# Patient Record
Sex: MALE | Race: ASIAN | Employment: STUDENT | URBAN - NONMETROPOLITAN AREA
[De-identification: names, ages, dates, MRNs, and addresses within clinical notes are randomized per-mention and may not be internally consistent; named-entity substitution may affect disease eponyms.]

---

## 2024-04-26 ENCOUNTER — OFFICE VISIT (OUTPATIENT)
Age: 24
End: 2024-04-26

## 2024-04-26 VITALS
SYSTOLIC BLOOD PRESSURE: 112 MMHG | HEART RATE: 64 BPM | DIASTOLIC BLOOD PRESSURE: 70 MMHG | HEIGHT: 68 IN | TEMPERATURE: 98.9 F | OXYGEN SATURATION: 99 % | RESPIRATION RATE: 16 BRPM | BODY MASS INDEX: 17.58 KG/M2 | WEIGHT: 116 LBS

## 2024-04-26 DIAGNOSIS — Z00.00 HEALTHY ADULT ON ROUTINE PHYSICAL EXAMINATION: ICD-10-CM

## 2024-04-26 DIAGNOSIS — Z11.1 SCREENING FOR TUBERCULOSIS: Primary | ICD-10-CM

## 2024-04-26 PROCEDURE — 99202 OFFICE O/P NEW SF 15 MIN: CPT | Performed by: NURSE PRACTITIONER

## 2024-04-26 PROCEDURE — 86580 TB INTRADERMAL TEST: CPT | Performed by: NURSE PRACTITIONER

## 2024-04-26 SDOH — ECONOMIC STABILITY: FOOD INSECURITY: WITHIN THE PAST 12 MONTHS, THE FOOD YOU BOUGHT JUST DIDN'T LAST AND YOU DIDN'T HAVE MONEY TO GET MORE.: NEVER TRUE

## 2024-04-26 SDOH — ECONOMIC STABILITY: INCOME INSECURITY: HOW HARD IS IT FOR YOU TO PAY FOR THE VERY BASICS LIKE FOOD, HOUSING, MEDICAL CARE, AND HEATING?: NOT VERY HARD

## 2024-04-26 SDOH — ECONOMIC STABILITY: FOOD INSECURITY: WITHIN THE PAST 12 MONTHS, YOU WORRIED THAT YOUR FOOD WOULD RUN OUT BEFORE YOU GOT MONEY TO BUY MORE.: NEVER TRUE

## 2024-04-26 SDOH — ECONOMIC STABILITY: HOUSING INSECURITY
IN THE LAST 12 MONTHS, WAS THERE A TIME WHEN YOU DID NOT HAVE A STEADY PLACE TO SLEEP OR SLEPT IN A SHELTER (INCLUDING NOW)?: NO

## 2024-04-26 ASSESSMENT — PATIENT HEALTH QUESTIONNAIRE - PHQ9
SUM OF ALL RESPONSES TO PHQ QUESTIONS 1-9: 0
2. FEELING DOWN, DEPRESSED OR HOPELESS: NOT AT ALL
SUM OF ALL RESPONSES TO PHQ9 QUESTIONS 1 & 2: 0
SUM OF ALL RESPONSES TO PHQ QUESTIONS 1-9: 0
1. LITTLE INTEREST OR PLEASURE IN DOING THINGS: NOT AT ALL

## 2024-04-26 ASSESSMENT — ENCOUNTER SYMPTOMS
WHEEZING: 0
NAUSEA: 0
COUGH: 0
DIARRHEA: 0
VOMITING: 0
SORE THROAT: 0
SHORTNESS OF BREATH: 0
CHEST TIGHTNESS: 0

## 2024-04-26 NOTE — PROGRESS NOTES
[facility] [place] [facility]  [facility] - [place] [address] [address] [facility] [address]  Dept: [phone]  Loc: [phone]     Arcadio Guerin is a 24 y.o. male who presents today for:  Chief Complaint   Patient presents with    physical     P5 physical        Assessment/Plan:     1. Healthy adult on routine physical examination  -PE WNL.  F/u with health center PRN    2. Screening for tuberculosis  - Mantoux testing       No results found for any visits on 04/26/24.     Medications Ordered:  No orders of the defined types were placed in this encounter.      No follow-ups on file.    No future appointments.    HPI:   Pt presents for pharmacy physical.  No current complaints.      Subjective:      Review of Systems   Constitutional:  Negative for chills, fever and unexpected weight change.   HENT:  Negative for congestion and sore throat.    Respiratory:  Negative for cough, chest tightness, shortness of breath and wheezing.    Cardiovascular:  Negative for chest pain and palpitations.   Gastrointestinal:  Negative for diarrhea, nausea and vomiting.   Skin:  Negative for rash.   Neurological:  Negative for dizziness, light-headedness and headaches.         Objective:     Vitals:    04/26/24 1131   BP: 112/70   Site: Right Upper Arm   Position: Sitting   Pulse: 64   Resp: 16   Temp: 98.9 °F (37.2 °C)   SpO2: 99%   Weight: 52.6 kg (116 lb)   Height: 1.727 m (5' 8\")       Body mass index is 17.64 kg/m².    Wt Readings from Last 3 Encounters:   04/26/24 52.6 kg (116 lb)     BP Readings from Last 3 Encounters:   04/26/24 112/70       Physical Exam  Vitals and nursing note reviewed.   Constitutional:       General: He is not in acute distress.     Appearance: Normal appearance. He is well-groomed and normal weight. He is not ill-appearing or toxic-appearing.   HENT:      Head: Normocephalic.      Right Ear: Hearing, tympanic membrane, ear canal and external ear normal.      Left Ear:

## 2024-04-26 NOTE — PROGRESS NOTES
Patient presents in office today for number 1 TB screening. After obtaining consent from patient 0.1 mL of Tuberculin Purified, Diluted Aplisol was administered into to the left forearm by Evette Aldrich LPN. Patient tolerated well and was advised to return in 48-72 hours for reading. Patient voiced understanding.

## 2024-04-29 ENCOUNTER — NURSE ONLY (OUTPATIENT)
Age: 24
End: 2024-04-29

## 2024-04-29 DIAGNOSIS — Z11.1 ENCOUNTER FOR PPD SKIN TEST READING: Primary | ICD-10-CM

## 2024-04-29 LAB
INDURATION: 0
TB SKIN TEST: NEGATIVE

## 2024-05-03 ENCOUNTER — NURSE ONLY (OUTPATIENT)
Age: 24
End: 2024-05-03

## 2024-05-03 DIAGNOSIS — Z11.1 SCREENING FOR TUBERCULOSIS: Primary | ICD-10-CM

## 2024-05-03 NOTE — PROGRESS NOTES
Second step PPD into the right  forearm patient tolerated well , patient advised in this visit to return in 48 - 72 hours for the reading.

## 2024-05-06 ENCOUNTER — NURSE ONLY (OUTPATIENT)
Age: 24
End: 2024-05-06

## 2024-05-06 DIAGNOSIS — Z11.1 ENCOUNTER FOR PPD SKIN TEST READING: Primary | ICD-10-CM

## 2024-05-06 LAB
INDURATION: NORMAL
TB SKIN TEST: NEGATIVE

## 2024-05-06 PROCEDURE — 99999 PR OFFICE/OUTPT VISIT,PROCEDURE ONLY: CPT | Performed by: NURSE PRACTITIONER
